# Patient Record
Sex: MALE | Race: WHITE | Employment: STUDENT | URBAN - METROPOLITAN AREA
[De-identification: names, ages, dates, MRNs, and addresses within clinical notes are randomized per-mention and may not be internally consistent; named-entity substitution may affect disease eponyms.]

---
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
## 2023-07-11 ENCOUNTER — OFFICE VISIT (OUTPATIENT)
Dept: FAMILY MEDICINE CLINIC | Age: 18
End: 2023-07-11
Payer: COMMERCIAL

## 2023-07-11 VITALS
SYSTOLIC BLOOD PRESSURE: 102 MMHG | OXYGEN SATURATION: 98 % | WEIGHT: 141 LBS | BODY MASS INDEX: 22.08 KG/M2 | HEART RATE: 90 BPM | DIASTOLIC BLOOD PRESSURE: 60 MMHG

## 2023-07-11 DIAGNOSIS — M25.512 ACUTE PAIN OF LEFT SHOULDER: Primary | ICD-10-CM

## 2023-07-11 DIAGNOSIS — G56.22 CUBITAL TUNNEL SYNDROME ON LEFT: ICD-10-CM

## 2023-07-11 DIAGNOSIS — M77.8: ICD-10-CM

## 2023-07-11 PROCEDURE — 99214 OFFICE O/P EST MOD 30 MIN: CPT | Performed by: STUDENT IN AN ORGANIZED HEALTH CARE EDUCATION/TRAINING PROGRAM

## 2023-07-11 RX ORDER — NAPROXEN 500 MG/1
500 TABLET ORAL 2 TIMES DAILY WITH MEALS
Qty: 28 TABLET | Refills: 0 | Status: SHIPPED | OUTPATIENT
Start: 2023-07-11 | End: 2023-07-25

## 2023-07-11 SDOH — ECONOMIC STABILITY: INCOME INSECURITY: HOW HARD IS IT FOR YOU TO PAY FOR THE VERY BASICS LIKE FOOD, HOUSING, MEDICAL CARE, AND HEATING?: NOT HARD AT ALL

## 2023-07-11 SDOH — ECONOMIC STABILITY: FOOD INSECURITY: WITHIN THE PAST 12 MONTHS, THE FOOD YOU BOUGHT JUST DIDN'T LAST AND YOU DIDN'T HAVE MONEY TO GET MORE.: NEVER TRUE

## 2023-07-11 SDOH — ECONOMIC STABILITY: FOOD INSECURITY: WITHIN THE PAST 12 MONTHS, YOU WORRIED THAT YOUR FOOD WOULD RUN OUT BEFORE YOU GOT MONEY TO BUY MORE.: NEVER TRUE

## 2023-07-11 SDOH — ECONOMIC STABILITY: HOUSING INSECURITY
IN THE LAST 12 MONTHS, WAS THERE A TIME WHEN YOU DID NOT HAVE A STEADY PLACE TO SLEEP OR SLEPT IN A SHELTER (INCLUDING NOW)?: NO

## 2023-07-11 ASSESSMENT — ENCOUNTER SYMPTOMS
DIARRHEA: 0
BACK PAIN: 0
SORE THROAT: 0
COUGH: 0
VOMITING: 0
NAUSEA: 0
ABDOMINAL PAIN: 0
WHEEZING: 0
SINUS PAIN: 0

## 2023-07-11 ASSESSMENT — PATIENT HEALTH QUESTIONNAIRE - PHQ9
SUM OF ALL RESPONSES TO PHQ9 QUESTIONS 1 & 2: 0
2. FEELING DOWN, DEPRESSED OR HOPELESS: 0
SUM OF ALL RESPONSES TO PHQ QUESTIONS 1-9: 0
1. LITTLE INTEREST OR PLEASURE IN DOING THINGS: 0
SUM OF ALL RESPONSES TO PHQ QUESTIONS 1-9: 0

## 2023-07-11 NOTE — PATIENT INSTRUCTIONS
SURVEY:    You may be receiving a survey from [facility] regarding your visit today. You may get this in the mail, through your MyChart or in your email. Please complete the survey to enable us to provide the highest quality of care to you and your family. If you cannot score us as very good ( 5 Stars) on any question, please feel free to call the office to discuss how we could have made your experience exceptional.     Thank you.     Clinical Care Team:  DO Yani Jay LPN    Triage:  Margaret Parisi, [address] [facility] Team:  Michael Moy

## 2023-07-11 NOTE — PROGRESS NOTES
HPI Notes    Name: Haider Morgan  : 2005         Chief Complaint:     Chief Complaint   Patient presents with    Shoulder Pain     Patient has been experiencing pain, numbness and tingling of the left shoulder. Symptoms radiates down to finger tips and started 2 weeks ago. History of Present Illness:      HPI    This is a 78-year-old young man presenting for evaluation of acute pain of the left shoulder. He has been also experiencing numbness and tingling of this region. The paresthesias radiate to his fingertips, particularly the third, fourth, fifth digits. Symptoms began around 2 weeks ago. He first noticed this when reaching overhead while at work at [employer]. He has been lifting recently for summer track practice, but has not been aggressive with his regimen or tried for any Prs. Provocative motions include abduction of the [address] joint, as well as placing the hand in the small of the back. He has not attempted any palliative measures. Past Medical History:     No past medical history on file. Reviewed all health maintenance requirements and ordered appropriate tests  Health Maintenance Due   Topic Date Due    COVID-19 Vaccine (1) Never done    HPV vaccine (1 - Male 2-dose series) Never done    HIV screen  Never done    Hepatitis C screen  Never done       Past Surgical History:     No past surgical history on file. Medications:       Prior to Admission medications    Medication Sig Start Date End Date Taking? Authorizing Provider   naproxen (NAPROSYN) 500 MG tablet Take 1 tablet by mouth 2 times daily (with meals) for 14 days 23 Yes Robert Medina DO        Allergies:       Patient has no known allergies. Social History:     Tobacco:    reports that he has never smoked. He has never used smokeless tobacco.  Alcohol:      has no history on file for alcohol use. Drug Use:  has no history on file for drug use.     Family History:     No family history on

## 2023-07-19 ENCOUNTER — HOSPITAL ENCOUNTER (OUTPATIENT)
Dept: PHYSICAL THERAPY | Age: 18
Setting detail: THERAPIES SERIES
Discharge: HOME | End: 2023-07-19
Attending: STUDENT IN AN ORGANIZED HEALTH CARE EDUCATION/TRAINING PROGRAM
Payer: COMMERCIAL

## 2023-07-19 PROCEDURE — 97110 THERAPEUTIC EXERCISES: CPT

## 2023-07-19 PROCEDURE — 97161 PT EVAL LOW COMPLEX 20 MIN: CPT

## 2023-07-19 ASSESSMENT — PAIN SCALES - GENERAL: PAINLEVEL_OUTOF10: 2

## 2023-07-19 NOTE — THERAPY EVALUATION
Goals  Short Term Goals  Time Frame for Short Term Goals: 4 visits  Short Term Goal 1: Patient will demonstrate compliance with current HEP to optimize therapy progress    Long Term Goals  Time Frame for Long Term Goals : 8 visits  Long Term Goal 1: Patient will improve left shoulder AROM to 165 deg flexion and abduction to allow overhead reaching without pain  Long Term Goal 2: Patient will improve left shoulder MMT to 5/5 in all planes to allow return to lifting  Long Term Goal 3: Patient will improve UEFS score from 63/80 to 75/80 to demonstrate functional improvement    Patient's Goal:  Patient Goals : Eliminate pain    Timed Code Treatment Minutes: 10 Minutes  Total Treatment Time: 50     Time In: 1000  Time Out: [facility], PT Date: 7/19/2023

## 2023-07-19 NOTE — PLAN OF CARE
[facility] JYOTHI MADRIGAL       Phone: [phone]   Date: 2023                      Outpatient Physical Therapy  Fax: [phone]    [facility]                     Plan [facility] Care  [facility]#: [id number]  Patient: Zoë Felton  : 2005    Referring Provider (secondary): Eladia    Diagnosis: Left shoulder pain  Onset Date: 23  Treatment Diagnosis: Left shoulder pain    Assessment  Body Structures, Functions, Activity Limitations Requiring Skilled Therapeutic Intervention: Decreased functional mobility , Decreased ADL status, Decreased ROM, Decreased body mechanics, Decreased tolerance to work activity, Decreased strength, Increased pain, Decreased posture  Assessment: Patient presents with c/o left posterior shoulder pain and T/N to 3-5th fingers. He has limited shoulder ROM with end range pain and some weakness in left shoulder, specifically ER. However, cervical Roya treatment was able to improve shoulder strength and AROM quickly. Question if his problem originates in cervical spine at this point. Will treat with cervical Roya extension program, but will also address shoulder strength and ROM. Modalities PRN for symptom control.   Therapy Prognosis: Excellent    Treatment Plan   Days: 2 times per week Weeks: 4 weeks Total # of Visits Approved: 8    Patient Education/HEP, Therapeutic Exercise, Manual Therapy: Myofacial Release/Cupping, Manual Therapy: Mobilization/Manipulation, Neuro Re-ed, Traction, Dry Needling, HP/CP, Electrical Stimulation, Ultrasound, and Therapeutic Activity     Goals  Short Term Goals  Time Frame for Short Term Goals: 4 visits  Short Term Goal 1: Patient will demonstrate compliance with current HEP to optimize therapy progress  Long Term Goals  Time Frame for Long Term Goals : 8 visits  Long Term Goal 1: Patient will improve left shoulder AROM to 165 deg flexion and abduction to allow overhead reaching without pain  Long Term Goal 2: Patient will improve

## 2023-07-24 ENCOUNTER — HOSPITAL ENCOUNTER (OUTPATIENT)
Dept: PHYSICAL THERAPY | Age: 18
Setting detail: THERAPIES SERIES
Discharge: HOME | End: 2023-07-24
Attending: STUDENT IN AN ORGANIZED HEALTH CARE EDUCATION/TRAINING PROGRAM
Payer: COMMERCIAL

## 2023-07-24 PROCEDURE — 97110 THERAPEUTIC EXERCISES: CPT

## 2023-07-24 PROCEDURE — 97140 MANUAL THERAPY 1/> REGIONS: CPT

## 2023-07-24 ASSESSMENT — PAIN SCALES - GENERAL: PAINLEVEL_OUTOF10: 3

## 2023-07-24 NOTE — PROGRESS NOTES
Phone: [phone] [facility]      Fax: [phone]                            Outpatient Physical Therapy                                                                            Daily Note    Date: 2023  Patient Name: Benetta Goldmann        MRN: [id number]   ACCT#:  [de-identified]  : 2005  (21 y.o.)    Referring Provider (secondary): Eladia         Diagnosis: Left shoulder pain  Treatment Diagnosis: Left shoulder pain    Onset Date: 23  PT Insurance Information: BCBS, 70 visits  Total # of Visits Approved: 8 Per Physician Order  Total # of Visits to Date: 2  Plan of Care/Certification Expiration Date: 23    Pre-Treatment Pain:  3/10  Subjective: 1330:  Reports compliance with HEP about 6 times daily. Feels his motion is improved. Feels his motion is better and T/N is better. Saw ortho last week who told him to continue therapy. Assessment  Assessment: ROM continues to improve. Did well with shoulder strengthening and ROM progressions, but sore afterward. Points more toward clavicle area though. Will continue to push cervical posterior derangement treatment and shoulder/postural strength. Plan  Continue with current plan of care    Exercises/Modalities/Manual:  See DocFlow Sheet    Education: education on updated HEP with written handout.   Education on progression of exercise          Goals  (Total # of Visits to Date: 2)   Short Term Goals  Time Frame for Short Term Goals: 4 visits  Short Term Goal 1: Patient will demonstrate compliance with current HEP to optimize therapy progress    Long Term Goals  Time Frame for Long Term Goals : 8 visits  Long Term Goal 1: Patient will improve left shoulder AROM to 165 deg flexion and abduction to allow overhead reaching without pain  Long Term Goal 2: Patient will improve left shoulder MMT to 5/5 in all planes to allow return to lifting  Long Term Goal 3: Patient will improve UEFS score from 63/80 to 75/80 to

## 2023-07-28 ENCOUNTER — HOSPITAL ENCOUNTER (OUTPATIENT)
Dept: PHYSICAL THERAPY | Age: 18
Setting detail: THERAPIES SERIES
Discharge: HOME | End: 2023-07-28
Attending: STUDENT IN AN ORGANIZED HEALTH CARE EDUCATION/TRAINING PROGRAM
Payer: COMMERCIAL

## 2023-07-28 PROCEDURE — 97110 THERAPEUTIC EXERCISES: CPT

## 2023-07-28 PROCEDURE — 97140 MANUAL THERAPY 1/> REGIONS: CPT

## 2023-07-28 ASSESSMENT — PAIN SCALES - GENERAL: PAINLEVEL_OUTOF10: 2

## 2023-07-28 NOTE — PROGRESS NOTES
Phone: [phone] [facility]      Fax: [phone]                            Outpatient Physical Therapy                                                                            Daily Note    Date: 2023  Patient Name: Angela Corrales        MRN: [id number]   ACCT#:  [de-identified]  : 2005  (21 y.o.)    Referring Provider (secondary): Eladia         Diagnosis: Left shoulder pain  Treatment Diagnosis: Left shoulder pain    Onset Date: 23  PT Insurance Information: BCBS, 79 visits  Total # of Visits Approved: 8 Per Physician Order  Total # of Visits to Date: 3  Plan of Care/Certification Expiration Date: 23    Pre-Treatment Pain:  -2/10     Assessment  Assessment: Patient states L shoulder pain is a -2/10 this afternoon. Following last session patient noted scapular muscle soreness. Continued with posterior derangement at beginning of session followed by shoulder/scapular strengthening exercises. Post session patient notes muscle soreness. Will continue to progress as able.     Plan  Continue with current plan of care    Exercises/Modalities/Manual:  See DocFlow Sheet    Education: Exercise form      Goals  (Total # of Visits to Date: 3)   Short Term Goals  Time Frame for Short Term Goals: 4 visits  Short Term Goal 1: Patient will demonstrate compliance with current HEP to optimize therapy progress - MET    Long Term Goals  Time Frame for Long Term Goals : 8 visits  Long Term Goal 1: Patient will improve left shoulder AROM to 165 deg flexion and abduction to allow overhead reaching without pain  Long Term Goal 2: Patient will improve left shoulder MMT to 5/5 in all planes to allow return to lifting  Long Term Goal 3: Patient will improve UEFS score from 63/80 to 75/80 to demonstrate functional improvement    Post Treatment Pain:  -2/10    Time In: 1248    Time Out : 1328   Timed Code Treatment Minutes: 40 Minutes  Total Treatment Time: 0519 [facility]

## 2023-08-01 ENCOUNTER — HOSPITAL ENCOUNTER (OUTPATIENT)
Dept: PHYSICAL THERAPY | Age: 18
Setting detail: THERAPIES SERIES
Discharge: HOME | End: 2023-08-01
Attending: STUDENT IN AN ORGANIZED HEALTH CARE EDUCATION/TRAINING PROGRAM
Payer: COMMERCIAL

## 2023-08-01 PROCEDURE — 97110 THERAPEUTIC EXERCISES: CPT

## 2023-08-01 NOTE — PROGRESS NOTES
Phone: [phone] [facility]      Fax: [phone]                            Outpatient Physical Therapy                                                                            Daily Note    Date: 2023  Patient Name: Germain Livingston        MRN: [id number]   ACCT#:  [de-identified]  : 2005  (21 y.o.)    Referring Provider (secondary): Eladia         Diagnosis: Left shoulder pain  Treatment Diagnosis: Left shoulder pain    Onset Date: 23  PT Insurance Information: BCBS, 70 visits  Total # of Visits Approved: 8 Per Physician Order  Total # of Visits to Date: 4  Plan of Care/Certification Expiration Date: 23    Pre-Treatment Pain:  0/10  Subjective: 1300:  Denies pain today. Has been feeling better lately. Less pain at work. Reports compliance with HEP. Assessment  Assessment: Patient continues to do quite well. Responding well to current POC. Denies pain after treatment, just fatigued. Difficulty noted with body blade technique. Will continue to push cervical Roya program and shoulder strengthening/stability exercises.     Plan  Continue with current plan of care    Exercises/Modalities/Manual:  See DocFlow Sheet    Education: education on continued HEP and exercise technique throughout session          Goals  (Total # of Visits to Date: 4)   Short Term Goals  Time Frame for Short Term Goals: 4 visits  Short Term Goal 1: Patient will demonstrate compliance with current HEP to optimize therapy progress - MET    Long Term Goals  Time Frame for Long Term Goals : 8 visits  Long Term Goal 1: Patient will improve left shoulder AROM to 165 deg flexion and abduction to allow overhead reaching without pain  Long Term Goal 2: Patient will improve left shoulder MMT to 5/5 in all planes to allow return to lifting  Long Term Goal 3: Patient will improve UEFS score from 63/80 to 75/80 to demonstrate functional improvement    Post Treatment Pain:  0/10    Time In: 1300

## 2023-08-03 ENCOUNTER — HOSPITAL ENCOUNTER (OUTPATIENT)
Dept: PHYSICAL THERAPY | Age: 18
Setting detail: THERAPIES SERIES
Discharge: HOME | End: 2023-08-03
Attending: STUDENT IN AN ORGANIZED HEALTH CARE EDUCATION/TRAINING PROGRAM
Payer: COMMERCIAL

## 2023-08-03 PROCEDURE — 97140 MANUAL THERAPY 1/> REGIONS: CPT

## 2023-08-03 PROCEDURE — 97110 THERAPEUTIC EXERCISES: CPT

## 2023-08-03 NOTE — PROGRESS NOTES
Phone: [phone] [facility]      Fax: [phone]                            Outpatient Physical Therapy                                                                            Daily Note    Date: 8/3/2023  Patient Name: Ari Thomas        MRN: [id number]   ACCT#:  [de-identified]  : 2005  (21 y.o.)    Referring Provider (secondary): Eladia         Diagnosis: Left shoulder pain  Treatment Diagnosis: Left shoulder pain    Onset Date: 23  PT Insurance Information: BCBS, 79 visits  Total # of Visits Approved: 8 Per Physician Order  Total # of Visits to Date: 5  Plan of Care/Certification Expiration Date: 23    Pre-Treatment Pain:  0/10     Assessment  Assessment: Patient reports no L shoulder pain this afternoon. He notes he had some muscle soreness following last session. Continued with Beto [place] treatment of neck followed by scapular and shoulder strengthening exercises as outlined. Attempted high plank step ups, but had increased pain. Patient continue to struggle with Body Blade technique also. Post session patient notes pain is a 2-3/10.     Plan  Continue with current plan of care    Exercises/Modalities/Manual:  See DocFlow Sheet    Education: Exercise form     Goals  (Total # of Visits to Date: 5)   Short Term Goals  Time Frame for Short Term Goals: 4 visits  Short Term Goal 1: Patient will demonstrate compliance with current HEP to optimize therapy progress - MET    Long Term Goals  Time Frame for Long Term Goals : 8 visits  Long Term Goal 1: Patient will improve left shoulder AROM to 165 deg flexion and abduction to allow overhead reaching without pain  Long Term Goal 2: Patient will improve left shoulder MMT to 5/5 in all planes to allow return to lifting  Long Term Goal 3: Patient will improve UEFS score from 63/80 to 75/80 to demonstrate functional improvement    Post Treatment Pain:  2-3/10    Time In: 1248    Time Out : 1330   Timed Code Treatment

## 2023-08-07 ENCOUNTER — HOSPITAL ENCOUNTER (OUTPATIENT)
Dept: PHYSICAL THERAPY | Age: 18
Setting detail: THERAPIES SERIES
Discharge: HOME | End: 2023-08-07
Attending: STUDENT IN AN ORGANIZED HEALTH CARE EDUCATION/TRAINING PROGRAM
Payer: COMMERCIAL

## 2023-08-07 NOTE — PROGRESS NOTES
Phone: [phone] [facility]      Fax: [phone]                            Outpatient Physical Therapy                                                                            Daily Note    Date: 2023  Patient Name: Bernadette Garcia        MRN: [id number]   ACCT#:  [de-identified]  : 2005  (21 y.o.)         Per Physician Order  No Show: 1    Subjective: No call, no show.   Called patient and left VM reminder for next visit    Seferino Can PT     Date: 2023

## 2023-08-14 ENCOUNTER — HOSPITAL ENCOUNTER (OUTPATIENT)
Dept: PHYSICAL THERAPY | Age: 18
Setting detail: THERAPIES SERIES
Discharge: HOME | End: 2023-08-14
Attending: STUDENT IN AN ORGANIZED HEALTH CARE EDUCATION/TRAINING PROGRAM
Payer: COMMERCIAL

## 2023-08-14 PROCEDURE — 97110 THERAPEUTIC EXERCISES: CPT

## 2023-08-14 PROCEDURE — 97140 MANUAL THERAPY 1/> REGIONS: CPT

## 2023-08-14 NOTE — PROGRESS NOTES
Phone: [phone] [facility]      Fax: [phone]                            Outpatient Physical Therapy                                                                            Daily Note    Date: 2023  Patient Name: Apurva Harmon        MRN: [id number]   ACCT#:  [de-identified]  : 2005  (21 y.o.)    Referring Provider (secondary): Eladia         Diagnosis: Left shoulder pain  Treatment Diagnosis: Left shoulder pain    Onset Date: 23  PT Insurance Information: BCBS, 79 visits  Total # of Visits Approved: 8 Per Physician Order  Total # of Visits to Date: 6  No Show: 1  Canceled Appointment: 1  Plan of Care/Certification Expiration Date: 23    Pre-Treatment Pain:  0/10     Assessment  Assessment: Patient denies L shoulder pain this afternoon. Patient notes he was able to lift and carry light boxes over the weekend while helping girlfriend move into dorm without increased pain. Continued with manual for neck and ther ex for shoulder and scapular strengthening exercises. Patient reported no pain throughout session. Since beginnning therapy patient notes he is 80-85% better, but still notes some weakness and pain/discomfort when lifting overhead. Post session patient denies pain.     Plan  Continue with current plan of care    Exercises/Modalities/Manual:  See DocFlow Sheet    Education: POC     Goals  (Total # of Visits to Date: 6)   Short Term Goals  Time Frame for Short Term Goals: 4 visits  Short Term Goal 1: Patient will demonstrate compliance with current HEP to optimize therapy progress - MET    Long Term Goals  Time Frame for Long Term Goals : 8 visits  Long Term Goal 1: Patient will improve left shoulder AROM to 165 deg flexion and abduction to allow overhead reaching without pain  Long Term Goal 2: Patient will improve left shoulder MMT to 5/5 in all planes to allow return to lifting  Long Term Goal 3: Patient will improve UEFS score from 63/80 to 75/80 to

## 2023-08-16 ENCOUNTER — HOSPITAL ENCOUNTER (OUTPATIENT)
Dept: PHYSICAL THERAPY | Age: 18
Setting detail: THERAPIES SERIES
Discharge: HOME | End: 2023-08-16
Attending: STUDENT IN AN ORGANIZED HEALTH CARE EDUCATION/TRAINING PROGRAM
Payer: COMMERCIAL

## 2023-08-16 PROCEDURE — 97110 THERAPEUTIC EXERCISES: CPT

## 2023-08-16 NOTE — PROGRESS NOTES
Code Treatment Minutes: 40 Minutes  Total Treatment Time: 36 Minutes    Maryellen Benson, PT     Date: 8/16/2023

## 2023-08-18 ENCOUNTER — OFFICE VISIT (OUTPATIENT)
Dept: FAMILY MEDICINE CLINIC | Age: 18
End: 2023-08-18
Payer: COMMERCIAL

## 2023-08-18 VITALS — OXYGEN SATURATION: 98 % | SYSTOLIC BLOOD PRESSURE: 100 MMHG | DIASTOLIC BLOOD PRESSURE: 60 MMHG | HEART RATE: 55 BPM

## 2023-08-18 DIAGNOSIS — M25.512 ACUTE PAIN OF LEFT SHOULDER: Primary | ICD-10-CM

## 2023-08-18 DIAGNOSIS — M77.8: ICD-10-CM

## 2023-08-18 PROCEDURE — 99213 OFFICE O/P EST LOW 20 MIN: CPT | Performed by: STUDENT IN AN ORGANIZED HEALTH CARE EDUCATION/TRAINING PROGRAM

## 2023-08-18 ASSESSMENT — ENCOUNTER SYMPTOMS
COUGH: 0
DIARRHEA: 0
VOMITING: 0
NAUSEA: 0
SINUS PAIN: 0
ABDOMINAL PAIN: 0
WHEEZING: 0
SORE THROAT: 0
BACK PAIN: 0

## 2023-08-18 NOTE — PATIENT INSTRUCTIONS
SURVEY:    You may be receiving a survey from [employer] regarding your visit today. You may get this in the mail, through your MyChart or in your email. Please complete the survey to enable us to provide the highest quality of care to you and your family. If you cannot score us as very good ( 5 Stars) on any question, please feel free to call the office to discuss how we could have made your experience exceptional.     Thank you.     Clinical Care Team:  DO Priya Aguayo LPN    Triage:  Mitul Maravilla, [address] [facility] Team:  Jarad Jacinto

## 2023-08-18 NOTE — PROGRESS NOTES
Please complete the survey to enable us to provide the highest quality of care to you and your family. If you cannot score us as very good ( 5 Stars) on any question, please feel free to call the office to discuss how we could have made your experience exceptional.     Thank you.     Clinical Care Team:  DO Brenden Dempsey LPN    Triage:  Justino Yang, [address] [facility] Team:  [address] [place] [address]      Electronically signed by Vic Luis DO on 8/18/2023 at 10:17 AM           Completed Refills   Requested Prescriptions      No prescriptions requested or ordered in this encounter

## 2023-08-22 ENCOUNTER — HOSPITAL ENCOUNTER (OUTPATIENT)
Dept: PHYSICAL THERAPY | Age: 18
Setting detail: THERAPIES SERIES
Discharge: HOME | End: 2023-08-22
Attending: STUDENT IN AN ORGANIZED HEALTH CARE EDUCATION/TRAINING PROGRAM
Payer: COMMERCIAL

## 2023-08-22 PROCEDURE — 97110 THERAPEUTIC EXERCISES: CPT

## 2023-08-22 NOTE — PROGRESS NOTES
Phone: 744 [facility]      Fax: [phone]                            Outpatient Physical Therapy                                                                            Daily Note    Date: 2023  Patient Name: Pema Loera        MRN: [id number]   ACCT#:  [de-identified]  : 2005  (21 y.o.)    Referring Provider (secondary): Eladia         Diagnosis: Left shoulder pain  Treatment Diagnosis: Left shoulder pain    Onset Date: 23  PT Insurance Information: BCBS, 79 visits  Total # of Visits Approved: 8 Per Physician Order  Total # of Visits to Date: 8  No Show: 1  Canceled Appointment: 1  Plan of Care/Certification Expiration Date: 23    Pre-Treatment Pain:  0/10     Assessment  Assessment: Patient denies L shoulder pain this morning. He reports no increased pain since last visit. Continued with scapular and shoulder exercises to improve functional strength. UEFS Score = 78/80. L shoulder strength MMT is 5/5 in all planes. Plan to place patient on hold x2 weeks as patient returns to lifting/practice. If pain increases within two weeks patient to call and schedule otherwise will D/C.     Plan  Place pt on hold    Exercises/Modalities/Manual:  See DocFlow Sheet    Education: Hold x2 weeks while he returns to lifting/practice     Goals  (Total # of Visits to Date: 8)   Short Term Goals  Time Frame for Short Term Goals: 4 visits  Short Term Goal 1: Patient will demonstrate compliance with current HEP to optimize therapy progress - MET    Long Term Goals  Time Frame for Long Term Goals : 8 visits  Long Term Goal 1: Patient will improve left shoulder AROM to 165 deg flexion and abduction to allow overhead reaching without pain - MET  Long Term Goal 2: Patient will improve left shoulder MMT to 5/5 in all planes to allow return to lifting - MET  Long Term Goal 3: Patient will improve UEFS score from 63/80 to 75/80 to demonstrate functional improvement - MET    Post

## 2023-08-25 ENCOUNTER — APPOINTMENT (OUTPATIENT)
Dept: PHYSICAL THERAPY | Age: 18
End: 2023-08-25
Attending: STUDENT IN AN ORGANIZED HEALTH CARE EDUCATION/TRAINING PROGRAM
Payer: COMMERCIAL

## 2024-05-16 ENCOUNTER — OFFICE VISIT (OUTPATIENT)
Dept: FAMILY MEDICINE CLINIC | Age: 19
End: 2024-05-16
Payer: COMMERCIAL

## 2024-05-16 ENCOUNTER — HOSPITAL ENCOUNTER (OUTPATIENT)
Age: 19
Discharge: HOME | End: 2024-05-18
Payer: COMMERCIAL

## 2024-05-16 ENCOUNTER — HOSPITAL ENCOUNTER (OUTPATIENT)
Dept: GENERAL RADIOLOGY | Age: 19
Discharge: HOME | End: 2024-05-18
Payer: COMMERCIAL

## 2024-05-16 VITALS
OXYGEN SATURATION: 98 % | HEIGHT: 67 IN | BODY MASS INDEX: 23.23 KG/M2 | WEIGHT: 148 LBS | SYSTOLIC BLOOD PRESSURE: 104 MMHG | DIASTOLIC BLOOD PRESSURE: 62 MMHG | HEART RATE: 64 BPM

## 2024-05-16 DIAGNOSIS — M25.561 ACUTE PAIN OF RIGHT KNEE: ICD-10-CM

## 2024-05-16 DIAGNOSIS — M25.561 ACUTE PAIN OF RIGHT KNEE: Primary | ICD-10-CM

## 2024-05-16 PROCEDURE — 73564 X-RAY EXAM KNEE 4 OR MORE: CPT

## 2024-05-16 PROCEDURE — 99213 OFFICE O/P EST LOW 20 MIN: CPT | Performed by: FAMILY MEDICINE

## 2024-05-16 ASSESSMENT — PATIENT HEALTH QUESTIONNAIRE - PHQ9
SUM OF ALL RESPONSES TO PHQ QUESTIONS 1-9: 0
SUM OF ALL RESPONSES TO PHQ9 QUESTIONS 1 & 2: 0
SUM OF ALL RESPONSES TO PHQ QUESTIONS 1-9: 0
2. FEELING DOWN, DEPRESSED OR HOPELESS: NOT AT ALL
1. LITTLE INTEREST OR PLEASURE IN DOING THINGS: NOT AT ALL

## 2024-05-16 NOTE — PATIENT INSTRUCTIONS
SURVEY:    You may be receiving a survey from [facility] [employer] regarding your visit today.    Please complete the survey to enable us to provide the highest quality of care to you and your family.    If you cannot score us a very good (5 Stars) on any question, please call the office to discuss how we could have made your experience a very good one.    Thank you.    Clinical Care Team: MD Roney Humphreys LPN              Triage: Abida Maddox CMA              Clerical Team: Abida De Leon

## 2024-05-16 NOTE — PROGRESS NOTES
fever.   Musculoskeletal:  Positive for arthralgias.        Rt knee    Neurological:  Negative for tingling and numbness.         Physical Exam:     Physical Exam  Vitals reviewed.   Constitutional:       General: He is not in acute distress.     Appearance: Normal appearance. He is not ill-appearing.   Musculoskeletal:         General: Swelling and tenderness present. No deformity.      Right knee: No swelling, deformity, effusion, erythema, ecchymosis, bony tenderness or crepitus. Normal range of motion. Tenderness present. No LCL laxity, MCL laxity, ACL laxity or PCL laxity.      Comments: Rt knee - mild Rt medial tenderness   Skin:     Findings: No erythema or rash.   Neurological:      Mental Status: He is alert.         Vitals:  /62   Pulse 64   Ht 1.702 m (5' 7\")   Wt 67.1 kg (148 lb)   SpO2 98%   BMI 23.18 kg/m²       Data:     No results found for: \"NA\", \"K\", \"CL\", \"CO2\", \"BUN\", \"CREATININE\", \"GLUCOSE\", \"PROT\", \"LABALBU\", \"BILITOT\", \"ALKPHOS\", \"AST\", \"ALT\"  No results found for: \"WBC\", \"RBC\", \"HGB\", \"HCT\", \"MCV\", \"MCH\", \"MCHC\", \"RDW\", \"PLT\", \"MPV\"  No results found for: \"TSH\"  No results found for: \"CHOL\", \"LDL\", \"HDL\", \"PSA\", \"LABA1C\"       Assessment/Plan:        1. Acute pain of right knee  Ice, NSAIDs and knee brace-- mild Rt MCL tenderness   Pt to get the xray today and then ortho referral     Return if symptoms worsen or fail to improve.      Electronically signed by Grace Rubio MD on 5/16/2024 at 10:00 AM

## 2024-05-25 ENCOUNTER — TRANSCRIBE ORDERS (OUTPATIENT)
Dept: ADMINISTRATIVE | Age: 19
End: 2024-05-25

## 2024-05-25 DIAGNOSIS — M25.561 ACUTE PAIN OF RIGHT KNEE: Primary | ICD-10-CM

## 2024-06-03 ENCOUNTER — HOSPITAL ENCOUNTER (OUTPATIENT)
Dept: MRI IMAGING | Age: 19
Discharge: HOME | End: 2024-06-05
Payer: COMMERCIAL

## 2024-06-03 DIAGNOSIS — M25.561 ACUTE PAIN OF RIGHT KNEE: ICD-10-CM

## 2024-06-03 PROCEDURE — 73721 MRI JNT OF LWR EXTRE W/O DYE: CPT

## 2025-01-20 ENCOUNTER — HOSPITAL ENCOUNTER (EMERGENCY)
Age: 20
Discharge: HOME | End: 2025-01-20
Attending: STUDENT IN AN ORGANIZED HEALTH CARE EDUCATION/TRAINING PROGRAM
Payer: COMMERCIAL

## 2025-01-20 ENCOUNTER — APPOINTMENT (OUTPATIENT)
Dept: GENERAL RADIOLOGY | Age: 20
End: 2025-01-20
Payer: COMMERCIAL

## 2025-01-20 VITALS
HEIGHT: 67 IN | WEIGHT: 140 LBS | SYSTOLIC BLOOD PRESSURE: 133 MMHG | HEART RATE: 80 BPM | OXYGEN SATURATION: 100 % | TEMPERATURE: 97.9 F | BODY MASS INDEX: 21.97 KG/M2 | DIASTOLIC BLOOD PRESSURE: 77 MMHG | RESPIRATION RATE: 18 BRPM
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
## 2025-01-20 DIAGNOSIS — M79.641 RIGHT HAND PAIN: ICD-10-CM

## 2025-01-20 PROCEDURE — 99283 EMERGENCY DEPT VISIT LOW MDM: CPT

## 2025-01-20 PROCEDURE — 6370000000 HC RX 637 (ALT 250 FOR IP): Performed by: STUDENT IN AN ORGANIZED HEALTH CARE EDUCATION/TRAINING PROGRAM

## 2025-01-20 PROCEDURE — 73130 X-RAY EXAM OF HAND: CPT

## 2025-01-20 RX ORDER — IBUPROFEN 400 MG/1
400 TABLET, FILM COATED ORAL ONCE
Status: COMPLETED | OUTPATIENT
Start: 2025-01-20 | End: 2025-01-20

## 2025-01-20 RX ORDER — ACETAMINOPHEN 325 MG/1
650 TABLET ORAL ONCE
Status: COMPLETED | OUTPATIENT
Start: 2025-01-20 | End: 2025-01-20

## 2025-01-20 RX ADMIN — IBUPROFEN 400 MG: 400 TABLET, FILM COATED ORAL at 21:36

## 2025-01-20 RX ADMIN — ACETAMINOPHEN 650 MG: 325 TABLET ORAL at 21:36

## 2025-01-20 ASSESSMENT — LIFESTYLE VARIABLES
HOW MANY STANDARD DRINKS CONTAINING ALCOHOL DO YOU HAVE ON A TYPICAL DAY: PATIENT DOES NOT DRINK
HOW OFTEN DO YOU HAVE A DRINK CONTAINING ALCOHOL: NEVER

## 2025-01-20 ASSESSMENT — PAIN SCALES - GENERAL: PAINLEVEL_OUTOF10: 6

## 2025-01-20 ASSESSMENT — PAIN - FUNCTIONAL ASSESSMENT: PAIN_FUNCTIONAL_ASSESSMENT: 0-10

## 2025-01-20 ASSESSMENT — PAIN DESCRIPTION - LOCATION: LOCATION: HAND

## 2025-01-20 ASSESSMENT — PAIN DESCRIPTION - ORIENTATION: ORIENTATION: RIGHT

## 2025-01-21 NOTE — DISCHARGE INSTRUCTIONS
Return to the Emergency Department if you develop any new or concerning symptoms or if your are getting worse

## 2025-01-21 NOTE — ED PROVIDER NOTES
per the Radiologist below, if available at the time of this note (none if blank):    XR HAND RIGHT (MIN 3 VIEWS)   Final Result   No acute osseous abnormality identified.             LABS: (none if blank)  Labs Reviewed - No data to display    (Any cultures that may have been sent were not resulted at the time of this patient visit; negative COVID-19 test should be interpreted as COVID no longer suspected unless otherwise noted in this encounter documentation note)    MEDICAL DECISION MAKING / ED COURSE:     ED Course as of 01/20/25 2203 Mon Jan 20, 2025 2202 19-year-old male presents after nonsyncopal fall.  Slight tenderness to the knuckle of the third digit of the right hand.  Neurovascular intact.  No fractures or dislocations or acute injury on x-ray.  Pain control with Tylenol and ibuprofen [TM]      ED Course User Index  [TM] Bruno English MD     Vitals reviewed:  ED Triage Vitals   BP Systolic BP Percentile Diastolic BP Percentile Temp Temp src Pulse Respirations SpO2   01/20/25 2124 -- -- 01/20/25 2124 -- 01/20/25 2122 01/20/25 2122 01/20/25 2122   133/77   97.9 °F (36.6 °C)  80 18 100 %      Height Weight - Scale         01/20/25 2122 01/20/25 2122         1.702 m (5' 7\") 63.5 kg (140 lb)                     Summary of Patient Presentation (see ED course if left blank):          MIPS:     ED Medications administered this visit:  (None if blank)  Medications   acetaminophen (TYLENOL) tablet 650 mg (650 mg Oral Given 1/20/25 2136)   ibuprofen (ADVIL;MOTRIN) tablet 400 mg (400 mg Oral Given 1/20/25 2136)       PROCEDURES: (None if blank)  Procedures:         FINAL IMPRESSION      1. Fall, initial encounter    2. Right hand pain          DISPOSITION/PLAN       Condition: condition: stable  Dispo: Discharge to home  DISPOSITION Decision To Discharge 01/20/2025 10:03:09 PM   DISPOSITION CONDITION Stable               Outpatient follow up (If applicable):  Grace Rubio MD  [address] [place] [address]